# Patient Record
Sex: FEMALE | Race: WHITE
[De-identification: names, ages, dates, MRNs, and addresses within clinical notes are randomized per-mention and may not be internally consistent; named-entity substitution may affect disease eponyms.]

---

## 2019-11-03 ENCOUNTER — HEALTH MAINTENANCE LETTER (OUTPATIENT)
Age: 64
End: 2019-11-03

## 2020-02-10 ENCOUNTER — HEALTH MAINTENANCE LETTER (OUTPATIENT)
Age: 65
End: 2020-02-10

## 2020-11-16 ENCOUNTER — HEALTH MAINTENANCE LETTER (OUTPATIENT)
Age: 65
End: 2020-11-16

## 2020-11-19 ENCOUNTER — OFFICE VISIT (OUTPATIENT)
Dept: AUDIOLOGY | Facility: CLINIC | Age: 65
End: 2020-11-19
Attending: FAMILY MEDICINE
Payer: COMMERCIAL

## 2020-11-19 PROCEDURE — 92592 HC HEARING AID CHECK, MONAURAL: CPT | Performed by: AUDIOLOGIST

## 2021-04-04 ENCOUNTER — HEALTH MAINTENANCE LETTER (OUTPATIENT)
Age: 66
End: 2021-04-04

## 2021-09-18 ENCOUNTER — HEALTH MAINTENANCE LETTER (OUTPATIENT)
Age: 66
End: 2021-09-18

## 2021-11-13 ENCOUNTER — HEALTH MAINTENANCE LETTER (OUTPATIENT)
Age: 66
End: 2021-11-13

## 2022-04-30 ENCOUNTER — HEALTH MAINTENANCE LETTER (OUTPATIENT)
Age: 67
End: 2022-04-30

## 2022-06-29 NOTE — PROGRESS NOTES
AUDIOLOGY REPORT    SUBJECTIVE: Neena Ramos, is a 65 year old female, who was seen at [facility] on 11/19/2020  for programming of hearing aids. Previous results have revealed a mild sloping to moderate sensorineural hearing loss bilaterally. Neena reports left hearing aid is not working well compared to the right. Sound is dampened.     OBJECTIVE: Based on patient report, the following changes were made: Overall gain was increased by 3-4 clicks on the left ear Nickie Series 3 i110 and the left ear Nickie Marshall i2400 micro SABI 312T. Sound seemed equal to the left and much clearer than when she came in.      Less than 5 minutes was spent in programming today.      ASSESSMENT: A follow-up for programming hearing aids was performed today. Changes to hearing aid was completed as outlined above.     PLAN: Neena will return for follow-up as needed. Please call this clinic with any questions regarding today s appointment.    René Rivera.  Licensed Audiologist  [place] #[id number]     Patient is in the prone position.

## 2022-11-20 ENCOUNTER — HEALTH MAINTENANCE LETTER (OUTPATIENT)
Age: 67
End: 2022-11-20

## 2023-06-01 ENCOUNTER — HEALTH MAINTENANCE LETTER (OUTPATIENT)
Age: 68
End: 2023-06-01

## 2023-11-19 ENCOUNTER — HEALTH MAINTENANCE LETTER (OUTPATIENT)
Age: 68
End: 2023-11-19